# Patient Record
Sex: MALE | Race: WHITE | NOT HISPANIC OR LATINO | URBAN - METROPOLITAN AREA
[De-identification: names, ages, dates, MRNs, and addresses within clinical notes are randomized per-mention and may not be internally consistent; named-entity substitution may affect disease eponyms.]

---

## 2019-08-24 ENCOUNTER — EMERGENCY (EMERGENCY)
Facility: HOSPITAL | Age: 24
LOS: 0 days | Discharge: HOME | End: 2019-08-24
Attending: EMERGENCY MEDICINE
Payer: COMMERCIAL

## 2019-08-24 VITALS
TEMPERATURE: 98 F | SYSTOLIC BLOOD PRESSURE: 129 MMHG | RESPIRATION RATE: 18 BRPM | DIASTOLIC BLOOD PRESSURE: 92 MMHG | HEART RATE: 77 BPM | OXYGEN SATURATION: 100 %

## 2019-08-24 VITALS — WEIGHT: 154.98 LBS | HEIGHT: 71 IN
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
## 2019-08-24 DIAGNOSIS — S09.90XA UNSPECIFIED INJURY OF HEAD, INITIAL ENCOUNTER: ICD-10-CM

## 2019-08-24 DIAGNOSIS — S49.92XA UNSPECIFIED INJURY OF LEFT SHOULDER AND UPPER ARM, INITIAL ENCOUNTER: ICD-10-CM
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
## 2019-08-24 PROCEDURE — 99053 MED SERV 10PM-8AM 24 HR FAC: CPT

## 2019-08-24 PROCEDURE — 99284 EMERGENCY DEPT VISIT MOD MDM: CPT

## 2019-08-24 PROCEDURE — 72125 CT NECK SPINE W/O DYE: CPT

## 2019-08-24 PROCEDURE — 70450 CT HEAD/BRAIN W/O DYE: CPT

## 2019-08-24 PROCEDURE — 73030 X-RAY EXAM OF SHOULDER: CPT | Mod: 26,LT

## 2019-08-24 PROCEDURE — 73060 X-RAY EXAM OF HUMERUS: CPT | Mod: LT

## 2019-08-24 PROCEDURE — 73030 X-RAY EXAM OF SHOULDER: CPT | Mod: LT

## 2019-08-24 PROCEDURE — 99284 EMERGENCY DEPT VISIT MOD MDM: CPT | Mod: 25

## 2019-08-24 PROCEDURE — 70450 CT HEAD/BRAIN W/O DYE: CPT | Mod: 26

## 2019-08-24 PROCEDURE — 72125 CT NECK SPINE W/O DYE: CPT | Mod: 26

## 2019-08-24 PROCEDURE — 73060 X-RAY EXAM OF HUMERUS: CPT | Mod: 26,LT

## 2019-08-24 NOTE — ED ADULT NURSE NOTE - OBJECTIVE STATEMENT
Patient fell off golf cart at 1800 today and has left shoulder pain and edema.  Pain increases significantly with movement to 10/10.  Patient has an abrasion to the back of shoulder.  Wound cleaned at home with peroxide and they put DMSO on the wound with a dressing.

## 2019-08-24 NOTE — ED STATDOCS - CARE PROVIDER_API CALL
Milady Griggs)  Orthopaedics  [address] [place] [address]  Phone: [phone]  Fax: [phone]  Follow Up Time:

## 2019-08-24 NOTE — ED STATDOCS - MUSCULOSKELETAL, MLM
Tenderness anterior and posterior portion of left shoulder. No step off. Decreased ROM on flexion and extension. No tenderness forearm, wrist, hand. No scalp tenderness or midline tenderness. Left sided paraspinal tenderness and spasm.

## 2019-08-24 NOTE — ED STATDOCS - PROGRESS NOTE DETAILS
XRs of shoulder and humerus negative.  Pt. does not wish for any pain meds at this time.  Sling applied.

## 2019-08-24 NOTE — ED STATDOCS - NSFOLLOWUPINSTRUCTIONS_ED_ALL_ED_FT
Keep sling on at all times.  Sleep on multiple pillows or on incline.  Ice affected shoulder for 15 min every 6 hours.  Bacitracin to abrasion on shoulder twice daily for 5 days.  Ibuprofen for pain every 6 hours as needed.  See an orthopedic surgeon within 24-72 hrs.

## 2019-08-24 NOTE — ED STATDOCS - OBJECTIVE STATEMENT
23 y/o male with no significant PMHx presents to the ED s/p fall off golf cart. Pt notes the golf cart was moving when he fell. Pt landed on left side. Pt hit head. No LOC. +left shoulder pain. Denies numbness, tingling. Not on anticoagulants. No other complaints at this time.

## 2019-08-24 NOTE — ED STATDOCS - CARE PLAN
Principal Discharge DX:	Shoulder injury, left, initial encounter  Secondary Diagnosis:	Closed head injury, initial encounter

## 2019-08-24 NOTE — ED PROCEDURE NOTE - NS ED PERI VASCULAR NEG
no cyanosis of extremity/no paresthesia/capillary refill time < 2 seconds/fingers/toes warm to touch

## 2022-02-18 NOTE — ED STATDOCS - NS_EDPROVIDERDISPOUSERTYPE_ED_A_ED
Dr. Norris Osuna with [facility] off [address] [address]. Referral pended for provider review. Scribe Attestation (For Scribes USE Only)... Attending Attestation (For Attendings USE Only).../Scribe Attestation (For Scribes USE Only)...